# Patient Record
Sex: FEMALE | Race: WHITE | Employment: UNEMPLOYED | URBAN - METROPOLITAN AREA
[De-identification: names, ages, dates, MRNs, and addresses within clinical notes are randomized per-mention and may not be internally consistent; named-entity substitution may affect disease eponyms.]

---

## 2023-01-09 ENCOUNTER — HOSPITAL ENCOUNTER (EMERGENCY)
Age: 58
Discharge: HOME | End: 2023-01-09
Attending: STUDENT IN AN ORGANIZED HEALTH CARE EDUCATION/TRAINING PROGRAM
Payer: MEDICAID

## 2023-01-09 ENCOUNTER — APPOINTMENT (OUTPATIENT)
Dept: CT IMAGING | Age: 58
End: 2023-01-09
Payer: MEDICAID

## 2023-01-09 VITALS
DIASTOLIC BLOOD PRESSURE: 52 MMHG | RESPIRATION RATE: 14 BRPM | HEART RATE: 65 BPM | TEMPERATURE: 98.4 F | SYSTOLIC BLOOD PRESSURE: 140 MMHG | HEIGHT: 67 IN | OXYGEN SATURATION: 96 % | WEIGHT: 277.78 LBS | BODY MASS INDEX: 43.6 KG/M2

## 2023-01-09 DIAGNOSIS — G89.29 ACUTE EXACERBATION OF CHRONIC LOW BACK PAIN: Primary | ICD-10-CM

## 2023-01-09 DIAGNOSIS — M54.50 ACUTE EXACERBATION OF CHRONIC LOW BACK PAIN: Primary | ICD-10-CM

## 2023-01-09 PROCEDURE — 72131 CT LUMBAR SPINE W/O DYE: CPT

## 2023-01-09 PROCEDURE — 99284 EMERGENCY DEPT VISIT MOD MDM: CPT

## 2023-01-09 RX ORDER — FUROSEMIDE 20 MG/1
TABLET ORAL
COMMUNITY
Start: 2022-11-10

## 2023-01-09 RX ORDER — CALCIUM CITRATE/VITAMIN D3 200MG-6.25
TABLET ORAL
COMMUNITY
Start: 2022-12-30

## 2023-01-09 RX ORDER — ATORVASTATIN CALCIUM 20 MG/1
TABLET, FILM COATED ORAL
COMMUNITY
Start: 2022-12-30

## 2023-01-09 RX ORDER — LIDOCAINE 50 MG/G
PATCH TOPICAL
COMMUNITY
Start: 2022-10-19

## 2023-01-09 RX ORDER — CALCIUM CARBONATE/VITAMIN D3 600MG-5MCG
TABLET ORAL
COMMUNITY
Start: 2022-10-27

## 2023-01-09 RX ORDER — ALBUTEROL SULFATE 90 UG/1
AEROSOL, METERED RESPIRATORY (INHALATION)
COMMUNITY
Start: 2022-12-30

## 2023-01-09 RX ORDER — INSULIN LISPRO 100 [IU]/ML
INJECTION, SOLUTION INTRAVENOUS; SUBCUTANEOUS
COMMUNITY
Start: 2022-12-30

## 2023-01-09 RX ORDER — ASPIRIN 81 MG/1
TABLET ORAL
COMMUNITY
Start: 2022-10-28

## 2023-01-09 RX ORDER — GLUCOSAM/CHON-MSM1/C/MANG/BOSW 500-416.6
TABLET ORAL
COMMUNITY
Start: 2022-12-30

## 2023-01-09 RX ORDER — BISACODYL 5 MG
TABLET, DELAYED RELEASE (ENTERIC COATED) ORAL
COMMUNITY
Start: 2022-12-30

## 2023-01-09 RX ORDER — PNV NO.95/FERROUS FUM/FOLIC AC 28MG-0.8MG
TABLET ORAL
COMMUNITY
Start: 2022-12-30

## 2023-01-09 RX ORDER — CARVEDILOL 3.12 MG/1
TABLET ORAL
COMMUNITY
Start: 2022-12-30

## 2023-01-09 RX ORDER — LANOLIN ALCOHOL/MO/W.PET/CERES
CREAM (GRAM) TOPICAL
COMMUNITY
Start: 2022-10-28

## 2023-01-09 RX ORDER — OXYCODONE HYDROCHLORIDE 5 MG/1
TABLET ORAL
COMMUNITY
Start: 2023-01-09

## 2023-01-09 RX ORDER — MONTELUKAST SODIUM 10 MG/1
TABLET ORAL
COMMUNITY
Start: 2022-11-28

## 2023-01-09 RX ORDER — TRAZODONE HYDROCHLORIDE 100 MG/1
TABLET ORAL
COMMUNITY
Start: 2022-12-30

## 2023-01-09 RX ORDER — PREGABALIN 100 MG/1
CAPSULE ORAL
COMMUNITY
Start: 2022-10-27

## 2023-01-09 RX ORDER — INSULIN GLARGINE 100 [IU]/ML
INJECTION, SOLUTION SUBCUTANEOUS
COMMUNITY
Start: 2022-12-30

## 2023-01-09 RX ORDER — SERTRALINE HYDROCHLORIDE 100 MG/1
TABLET, FILM COATED ORAL
COMMUNITY
Start: 2022-12-30

## 2023-01-09 RX ORDER — SYRINGE-NEEDLE,INSULIN,0.5 ML 31 GX5/16"
SYRINGE, EMPTY DISPOSABLE MISCELLANEOUS
COMMUNITY
Start: 2022-12-30

## 2023-01-09 RX ORDER — OMEPRAZOLE 20 MG/1
CAPSULE, DELAYED RELEASE ORAL
COMMUNITY
Start: 2022-12-30

## 2023-01-09 RX ORDER — MAGNESIUM OXIDE 400 MG/1
TABLET ORAL
COMMUNITY
Start: 2022-10-28

## 2023-01-09 RX ORDER — MOMETASONE FUROATE AND FORMOTEROL FUMARATE DIHYDRATE 200; 5 UG/1; UG/1
AEROSOL RESPIRATORY (INHALATION)
COMMUNITY
Start: 2022-10-27

## 2023-01-09 RX ORDER — DOCUSATE SODIUM 100 MG
CAPSULE ORAL
COMMUNITY
Start: 2022-12-30

## 2023-01-09 RX ORDER — FAMOTIDINE 20 MG
TABLET ORAL
COMMUNITY
Start: 2022-12-30

## 2023-01-09 ASSESSMENT — PAIN DESCRIPTION - PAIN TYPE
TYPE: ACUTE PAIN;CHRONIC PAIN
TYPE: ACUTE PAIN

## 2023-01-09 ASSESSMENT — PAIN DESCRIPTION - ONSET
ONSET: ON-GOING
ONSET: PROGRESSIVE

## 2023-01-09 ASSESSMENT — PAIN DESCRIPTION - LOCATION
LOCATION: BACK
LOCATION: BACK;SACRUM

## 2023-01-09 ASSESSMENT — PAIN SCALES - GENERAL
PAINLEVEL_OUTOF10: 9
PAINLEVEL_OUTOF10: 10

## 2023-01-09 ASSESSMENT — PAIN - FUNCTIONAL ASSESSMENT
PAIN_FUNCTIONAL_ASSESSMENT: 0-10
PAIN_FUNCTIONAL_ASSESSMENT: 0-10
PAIN_FUNCTIONAL_ASSESSMENT: PREVENTS OR INTERFERES SOME ACTIVE ACTIVITIES AND ADLS
PAIN_FUNCTIONAL_ASSESSMENT: PREVENTS OR INTERFERES SOME ACTIVE ACTIVITIES AND ADLS

## 2023-01-09 ASSESSMENT — PAIN DESCRIPTION - ORIENTATION
ORIENTATION: LOWER
ORIENTATION: RIGHT;LEFT;LOWER

## 2023-01-09 ASSESSMENT — PAIN DESCRIPTION - DESCRIPTORS
DESCRIPTORS: STABBING;SHOOTING
DESCRIPTORS: STABBING;SHOOTING

## 2023-01-09 ASSESSMENT — PAIN DESCRIPTION - FREQUENCY
FREQUENCY: CONTINUOUS
FREQUENCY: CONTINUOUS

## 2023-01-10 NOTE — DISCHARGE INSTRUCTIONS
Follow-up with your primary care doctor as we discussed about the findings on your CT scan. I have provided you with information. CT LUMBAR SPINE WO CONTRAST    Result Date: 1/9/2023  EXAMINATION: CT OF THE LUMBAR SPINE WITHOUT CONTRAST  1/9/2023 TECHNIQUE: CT of the lumbar spine was performed without the administration of intravenous contrast. Multiplanar reformatted images are provided for review. Adjustment of mA and/or kV according to patient size was utilized. Automated exposure control, iterative reconstruction, and/or weight based adjustment of the mA/kV was utilized to reduce the radiation dose to as low as reasonably achievable. COMPARISON: None HISTORY: Reason for Exam: low back pain s/p fall today FINDINGS: BONES/ALIGNMENT: There is normal alignment of the spine. Mild superior endplate deformity of L2 and L3 vertebral bodies. DEGENERATIVE CHANGES: Advanced multilevel degenerative changes. SOFT TISSUES/RETROPERITONEUM: No paraspinal mass is seen. Ascites and splenomegaly. Peripancreatic fat stranding. Mild superior endplate deformity of L2 and L3 vertebral bodies. Underlying compression fracture is difficult to exclude due to presence of Schmorl's nodes. Follow-up MRI exam is recommended if there is concern for acute fracture. Ascites in splenomegaly is noted. There is peripancreatic fat stranding, which could be secondary to pancreatitis or ascites. Follow-up CT abdomen and pelvis is recommended for complete assessment.

## 2023-01-10 NOTE — ED NOTES
Discharge education provided, patient verbalized understanding.   Patient discharged in stable condition to private car with copy of AVS.  Electronically signed by Lynette Tyson RN on 1/9/2023 at 10:18 PM       Lynette Tyson RN  01/09/23 9925

## 2023-01-10 NOTE — ED NOTES
Patient states she follows with a pain clinic.   Electronically signed by Caryle Rower, RN on 1/9/2023 at 8:24 PM       Caryle Rower, RN  01/09/23 2024

## 2023-01-10 NOTE — ED PROVIDER NOTES
Primary Care Physician: No primary care provider on file. Attending Physician: No att. providers found     History   Chief Complaint   Patient presents with    Fall    Back Pain     Patient states she \"just fell backwards on to the ground and my whole lower back has been hurting since. \"  Patient reports she coughed so hard she fell, denies hitting her head. HPI   Sharif Pitt  is a 61 y.o. female with history of chronic pain who presents complaining of lower back pain after a fall. Patient stated that around 10 AM she fell. She did take some anti-inflammatory medicines however the pain has persisted. She denies any stool or bladder incontinent. Pain is in the lower back. Denies any fevers chills nausea vomiting chest pain or shortness of breath. No abdominal pain nausea vomiting. No past medical history on file. No past surgical history on file. No family history on file. Social History     Socioeconomic History    Marital status:         Review of Systems   10 total systems reviewed and found to be negative unless otherwise noted in HPI     Physical Exam   BP (!) 140/52   Pulse 65   Temp 98.4 °F (36.9 °C) (Oral)   Resp 14   Ht 5' 7\" (1.702 m)   Wt 277 lb 12.5 oz (126 kg)   SpO2 96%   BMI 43.51 kg/m²      CONSTITUTIONAL: Well appearing, in no acute distress   HEAD: atraumatic, normocephalic   EYES: PERRL, No injection, discharge or scleral icterus. ENT: Moist mucous membranes. NECK: Normal ROM, NO LAD   CARDIOVASCULAR: Regular rate and rhythm. No murmurs or gallop. PULMONARY/CHEST: Airway patent. No retractions. Breath sounds clear with good air entry bilaterally. ABDOMEN: Soft, Non-distended and non-tender, without guarding or rebound. SKIN: Acyanotic, warm, dry   MUSCULOSKELETAL: No swelling, or deformity   NEUROLOGICAL: Awake and oriented x 3. Pulses intact. Grossly nonfocal   Nursing note and vitals reviewed.      ED Course & Medical Decision Making Medications - No data to display   Labs Reviewed - No data to display   CT LUMBAR SPINE WO CONTRAST   Final Result   Mild superior endplate deformity of L2 and L3 vertebral bodies. Underlying   compression fracture is difficult to exclude due to presence of Schmorl's   nodes. Follow-up MRI exam is recommended if there is concern for acute   fracture. Ascites in splenomegaly is noted. There is peripancreatic fat stranding,   which could be secondary to pancreatitis or ascites. Follow-up CT abdomen   and pelvis is recommended for complete assessment. PROCEDURES:   Procedures    ASSESSMENT AND PLAN:  [id number], Juan A Solis is a 61 y.o. female with history of chronic pain who presents complaining of lower back pain after a fall. Patient stated that around 10 AM she fell. She did take some anti-inflammatory medicines however the pain has persisted. She denies any stool or bladder incontinent. Pain is in the lower back. Denies any fevers chills nausea vomiting chest pain or shortness of breath. No abdominal pain nausea vomiting. On exam patient is nontoxic in no acute distress with tender to palpation lower abdomen. Deep tendon reflexes motor and sensory are all intact. Presentation was not concerning for infection. I also did not think this was cauda equina. I believe this was probably fracture versus lumbar sprain from a fall. A CT of lumbar was obtained was difficult to assess. However I do not think patient needed an emergent MRI. I recommended that she follows up with her primary care doctor for MRI if her pain persisted. Also of note of some incidental finding of ascites versus pancreatitis however liver patient does have a history of hepatomegaly which I believe is the cause of the ascites. At this time with no abdominal pain no fever there is no concerns for abdominal pathology. Patient presented with lower back pain from a fall.   She was stable discharged home recommendation to follow-up with primary care doctor for further evaluation and treatment. ClINICAL IMPRESSION:  1. Acute exacerbation of chronic low back pain          PATIENT REFERRED TO:  [facility]) Pre-Services  [phone]  Schedule an appointment as soon as possible for a visit in 2 days      DISCHARGE MEDICATIONS:  Discharge Medication List as of 1/9/2023  9:57 PM        DISCONTINUED MEDICATIONS:  Discharge Medication List as of 1/9/2023  9:57 PM        DISPOSITION Decision To Discharge 01/09/2023 09:37:06 PM    Amount and/or Complexity of Data Reviewed:  Clinical lab tests: ordered and reviewed   Tests in the radiology section of CPT®: ordered and reviewed   Tests in the medicine section of CPT®: ordered and reviewed   Decide to obtain previous medical records or to obtain history from someone other than the patient  Obtain history from someone other than the patient no  Review and summarize past medical records:yes  I looked up the patient in our electronic medical record:yes  Discuss the patient with other providers:yes  Independent visualization of images, tracings, or specimens:yes  Risk of Complications, Morbidity, and/or Mortality:Moderate  Presenting problems: moderate Diagnostic procedures: moderate yes Management options: moderate     ___________________________________________________________________________________  _________________________________________________________________________________________  This record is transcribed utilizing voice recognition technology. There are inherent limitations in this technology. In addition, there may be limitations in editing of this report. If there are any discrepancies, please contact me directly.   \      Rukhsana Lazo MD  01/09/23 9727